# Patient Record
Sex: FEMALE | Race: ASIAN | NOT HISPANIC OR LATINO | URBAN - METROPOLITAN AREA
[De-identification: names, ages, dates, MRNs, and addresses within clinical notes are randomized per-mention and may not be internally consistent; named-entity substitution may affect disease eponyms.]

---
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
## 2021-01-05 NOTE — ED PEDIATRIC NURSE NOTE - CHPI ED SYMPTOMS NEG
Patient father Eyad called regarding [employer] Script was denied by medicaid, needed more justification for use, is there anything more we can do to get this approved?   no diarrhea/no fever/no vomiting

## 2021-05-19 NOTE — ED PEDIATRIC TRIAGE NOTE - NS AS WEIGHT METHOD - PEDI/INFANT
Impression: Amblyopia suspect, right eye: H53.041.  Plan: monitor gave computer lenses
Impression: Regular astigmatism, bilateral: H52.223. Plan: Finalized new glasses Rx. Patient education on appropriate options of eye glasses. Return to clinic in 1 year for complete eye exam and refraction.
standing/actual